# Patient Record
Sex: FEMALE | Race: OTHER | HISPANIC OR LATINO
[De-identification: names, ages, dates, MRNs, and addresses within clinical notes are randomized per-mention and may not be internally consistent; named-entity substitution may affect disease eponyms.]

---

## 2023-09-18 ENCOUNTER — ASOB RESULT (OUTPATIENT)
Age: 33
End: 2023-09-18

## 2023-09-18 ENCOUNTER — APPOINTMENT (OUTPATIENT)
Dept: ANTEPARTUM | Facility: CLINIC | Age: 33
End: 2023-09-18
Payer: MEDICAID

## 2023-09-18 PROCEDURE — 76813 OB US NUCHAL MEAS 1 GEST: CPT

## 2023-09-18 PROCEDURE — 93976 VASCULAR STUDY: CPT

## 2023-10-11 ENCOUNTER — ASOB RESULT (OUTPATIENT)
Age: 33
End: 2023-10-11

## 2023-10-11 ENCOUNTER — APPOINTMENT (OUTPATIENT)
Dept: ANTEPARTUM | Facility: CLINIC | Age: 33
End: 2023-10-11
Payer: MEDICAID

## 2023-10-11 PROCEDURE — 76817 TRANSVAGINAL US OBSTETRIC: CPT

## 2023-10-11 PROCEDURE — 76805 OB US >/= 14 WKS SNGL FETUS: CPT

## 2023-11-15 ENCOUNTER — APPOINTMENT (OUTPATIENT)
Dept: ANTEPARTUM | Facility: CLINIC | Age: 33
End: 2023-11-15
Payer: MEDICAID

## 2023-11-15 ENCOUNTER — ASOB RESULT (OUTPATIENT)
Age: 33
End: 2023-11-15

## 2023-11-15 PROCEDURE — 76817 TRANSVAGINAL US OBSTETRIC: CPT

## 2023-11-15 PROCEDURE — 76811 OB US DETAILED SNGL FETUS: CPT | Mod: 1L

## 2023-12-11 PROBLEM — Z00.00 ENCOUNTER FOR PREVENTIVE HEALTH EXAMINATION: Status: ACTIVE | Noted: 2023-12-11

## 2023-12-29 ENCOUNTER — ASOB RESULT (OUTPATIENT)
Age: 33
End: 2023-12-29

## 2023-12-29 ENCOUNTER — APPOINTMENT (OUTPATIENT)
Dept: ANTEPARTUM | Facility: CLINIC | Age: 33
End: 2023-12-29
Payer: MEDICAID

## 2023-12-29 PROCEDURE — 76821 MIDDLE CEREBRAL ARTERY ECHO: CPT | Mod: 59

## 2023-12-29 PROCEDURE — 76819 FETAL BIOPHYS PROFIL W/O NST: CPT

## 2023-12-29 PROCEDURE — 76816 OB US FOLLOW-UP PER FETUS: CPT

## 2023-12-29 PROCEDURE — 76820 UMBILICAL ARTERY ECHO: CPT | Mod: 59

## 2024-01-19 ENCOUNTER — ASOB RESULT (OUTPATIENT)
Age: 34
End: 2024-01-19

## 2024-01-19 ENCOUNTER — APPOINTMENT (OUTPATIENT)
Dept: ANTEPARTUM | Facility: CLINIC | Age: 34
End: 2024-01-19
Payer: MEDICAID

## 2024-01-19 PROCEDURE — 76820 UMBILICAL ARTERY ECHO: CPT | Mod: 59

## 2024-01-19 PROCEDURE — 76819 FETAL BIOPHYS PROFIL W/O NST: CPT | Mod: 59

## 2024-01-19 PROCEDURE — 76816 OB US FOLLOW-UP PER FETUS: CPT

## 2024-02-23 ENCOUNTER — APPOINTMENT (OUTPATIENT)
Dept: ANTEPARTUM | Facility: CLINIC | Age: 34
End: 2024-02-23
Payer: MEDICAID

## 2024-02-23 ENCOUNTER — ASOB RESULT (OUTPATIENT)
Age: 34
End: 2024-02-23

## 2024-02-23 PROCEDURE — 76818 FETAL BIOPHYS PROFILE W/NST: CPT

## 2024-02-23 PROCEDURE — 76821 MIDDLE CEREBRAL ARTERY ECHO: CPT | Mod: 59

## 2024-02-23 PROCEDURE — 76816 OB US FOLLOW-UP PER FETUS: CPT

## 2024-02-23 PROCEDURE — 76820 UMBILICAL ARTERY ECHO: CPT | Mod: 59

## 2024-03-01 ENCOUNTER — ASOB RESULT (OUTPATIENT)
Age: 34
End: 2024-03-01

## 2024-03-01 ENCOUNTER — APPOINTMENT (OUTPATIENT)
Dept: ANTEPARTUM | Facility: CLINIC | Age: 34
End: 2024-03-01
Payer: MEDICAID

## 2024-03-01 PROCEDURE — 76821 MIDDLE CEREBRAL ARTERY ECHO: CPT | Mod: 59

## 2024-03-01 PROCEDURE — 76820 UMBILICAL ARTERY ECHO: CPT | Mod: 59

## 2024-03-01 PROCEDURE — 76818 FETAL BIOPHYS PROFILE W/NST: CPT

## 2024-03-08 ENCOUNTER — APPOINTMENT (OUTPATIENT)
Dept: ANTEPARTUM | Facility: CLINIC | Age: 34
End: 2024-03-08
Payer: MEDICAID

## 2024-03-08 ENCOUNTER — ASOB RESULT (OUTPATIENT)
Age: 34
End: 2024-03-08

## 2024-03-08 PROCEDURE — 76816 OB US FOLLOW-UP PER FETUS: CPT

## 2024-03-08 PROCEDURE — 76818 FETAL BIOPHYS PROFILE W/NST: CPT

## 2024-03-08 PROCEDURE — 76821 MIDDLE CEREBRAL ARTERY ECHO: CPT | Mod: 59

## 2024-03-15 ENCOUNTER — ASOB RESULT (OUTPATIENT)
Age: 34
End: 2024-03-15

## 2024-03-15 ENCOUNTER — APPOINTMENT (OUTPATIENT)
Dept: ANTEPARTUM | Facility: CLINIC | Age: 34
End: 2024-03-15
Payer: MEDICAID

## 2024-03-15 PROCEDURE — 76820 UMBILICAL ARTERY ECHO: CPT | Mod: 59

## 2024-03-15 PROCEDURE — 76818 FETAL BIOPHYS PROFILE W/NST: CPT

## 2024-03-15 PROCEDURE — 76821 MIDDLE CEREBRAL ARTERY ECHO: CPT | Mod: 59

## 2024-03-16 ENCOUNTER — INPATIENT (INPATIENT)
Facility: HOSPITAL | Age: 34
LOS: 1 days | Discharge: HOME | End: 2024-03-18
Attending: OBSTETRICS & GYNECOLOGY | Admitting: OBSTETRICS & GYNECOLOGY
Payer: COMMERCIAL

## 2024-03-16 VITALS
RESPIRATION RATE: 18 BRPM | SYSTOLIC BLOOD PRESSURE: 126 MMHG | DIASTOLIC BLOOD PRESSURE: 82 MMHG | HEART RATE: 77 BPM | TEMPERATURE: 98 F

## 2024-03-16 DIAGNOSIS — O26.899 OTHER SPECIFIED PREGNANCY RELATED CONDITIONS, UNSPECIFIED TRIMESTER: ICD-10-CM

## 2024-03-16 DIAGNOSIS — Z90.3 ACQUIRED ABSENCE OF STOMACH [PART OF]: Chronic | ICD-10-CM

## 2024-03-16 LAB
BASOPHILS # BLD AUTO: 0.04 K/UL — SIGNIFICANT CHANGE UP (ref 0–0.2)
BASOPHILS NFR BLD AUTO: 0.4 % — SIGNIFICANT CHANGE UP (ref 0–2)
BLD GP AB SCN SERPL QL: NEGATIVE — SIGNIFICANT CHANGE UP
EOSINOPHIL # BLD AUTO: 0.08 K/UL — SIGNIFICANT CHANGE UP (ref 0–0.5)
EOSINOPHIL NFR BLD AUTO: 0.8 % — SIGNIFICANT CHANGE UP (ref 0–6)
HCT VFR BLD CALC: 31.1 % — LOW (ref 34.5–45)
HGB BLD-MCNC: 9.6 G/DL — LOW (ref 11.5–15.5)
IMM GRANULOCYTES NFR BLD AUTO: 0.4 % — SIGNIFICANT CHANGE UP (ref 0–0.9)
LYMPHOCYTES # BLD AUTO: 1.53 K/UL — SIGNIFICANT CHANGE UP (ref 1–3.3)
LYMPHOCYTES # BLD AUTO: 15.5 % — SIGNIFICANT CHANGE UP (ref 13–44)
MCHC RBC-ENTMCNC: 23.7 PG — LOW (ref 27–34)
MCHC RBC-ENTMCNC: 30.9 GM/DL — LOW (ref 32–36)
MCV RBC AUTO: 76.8 FL — LOW (ref 80–100)
MONOCYTES # BLD AUTO: 0.63 K/UL — SIGNIFICANT CHANGE UP (ref 0–0.9)
MONOCYTES NFR BLD AUTO: 6.4 % — SIGNIFICANT CHANGE UP (ref 2–14)
NEUTROPHILS # BLD AUTO: 7.57 K/UL — HIGH (ref 1.8–7.4)
NEUTROPHILS NFR BLD AUTO: 76.5 % — SIGNIFICANT CHANGE UP (ref 43–77)
NRBC # BLD: 0 /100 WBCS — SIGNIFICANT CHANGE UP (ref 0–0)
PLATELET # BLD AUTO: 317 K/UL — SIGNIFICANT CHANGE UP (ref 150–400)
RBC # BLD: 4.05 M/UL — SIGNIFICANT CHANGE UP (ref 3.8–5.2)
RBC # FLD: 14.9 % — HIGH (ref 10.3–14.5)
RH IG SCN BLD-IMP: POSITIVE — SIGNIFICANT CHANGE UP
RH IG SCN BLD-IMP: POSITIVE — SIGNIFICANT CHANGE UP
T PALLIDUM AB TITR SER: NEGATIVE — SIGNIFICANT CHANGE UP
WBC # BLD: 9.89 K/UL — SIGNIFICANT CHANGE UP (ref 3.8–10.5)
WBC # FLD AUTO: 9.89 K/UL — SIGNIFICANT CHANGE UP (ref 3.8–10.5)

## 2024-03-16 RX ORDER — HYDROCORTISONE 1 %
1 OINTMENT (GRAM) TOPICAL EVERY 6 HOURS
Refills: 0 | Status: DISCONTINUED | OUTPATIENT
Start: 2024-03-16 | End: 2024-03-18

## 2024-03-16 RX ORDER — CHLORHEXIDINE GLUCONATE 213 G/1000ML
1 SOLUTION TOPICAL DAILY
Refills: 0 | Status: DISCONTINUED | OUTPATIENT
Start: 2024-03-16 | End: 2024-03-16

## 2024-03-16 RX ORDER — OXYTOCIN 10 UNIT/ML
41.67 VIAL (ML) INJECTION
Qty: 20 | Refills: 0 | Status: DISCONTINUED | OUTPATIENT
Start: 2024-03-16 | End: 2024-03-18

## 2024-03-16 RX ORDER — SIMETHICONE 80 MG/1
80 TABLET, CHEWABLE ORAL EVERY 4 HOURS
Refills: 0 | Status: DISCONTINUED | OUTPATIENT
Start: 2024-03-16 | End: 2024-03-18

## 2024-03-16 RX ORDER — BENZOCAINE 10 %
1 GEL (GRAM) MUCOUS MEMBRANE EVERY 6 HOURS
Refills: 0 | Status: DISCONTINUED | OUTPATIENT
Start: 2024-03-16 | End: 2024-03-18

## 2024-03-16 RX ORDER — ONDANSETRON 8 MG/1
4 TABLET, FILM COATED ORAL ONCE
Refills: 0 | Status: COMPLETED | OUTPATIENT
Start: 2024-03-16 | End: 2024-03-16

## 2024-03-16 RX ORDER — AER TRAVELER 0.5 G/1
1 SOLUTION RECTAL; TOPICAL EVERY 4 HOURS
Refills: 0 | Status: DISCONTINUED | OUTPATIENT
Start: 2024-03-16 | End: 2024-03-18

## 2024-03-16 RX ORDER — OXYTOCIN 10 UNIT/ML
VIAL (ML) INJECTION
Qty: 30 | Refills: 0 | Status: DISCONTINUED | OUTPATIENT
Start: 2024-03-16 | End: 2024-03-16

## 2024-03-16 RX ORDER — PRAMOXINE HYDROCHLORIDE 150 MG/15G
1 AEROSOL, FOAM RECTAL EVERY 4 HOURS
Refills: 0 | Status: DISCONTINUED | OUTPATIENT
Start: 2024-03-16 | End: 2024-03-18

## 2024-03-16 RX ORDER — INFLUENZA VIRUS VACCINE 15; 15; 15; 15 UG/.5ML; UG/.5ML; UG/.5ML; UG/.5ML
0.5 SUSPENSION INTRAMUSCULAR ONCE
Refills: 0 | Status: DISCONTINUED | OUTPATIENT
Start: 2024-03-16 | End: 2024-03-16

## 2024-03-16 RX ORDER — DIBUCAINE 1 %
1 OINTMENT (GRAM) RECTAL EVERY 6 HOURS
Refills: 0 | Status: DISCONTINUED | OUTPATIENT
Start: 2024-03-16 | End: 2024-03-18

## 2024-03-16 RX ORDER — IBUPROFEN 200 MG
600 TABLET ORAL EVERY 6 HOURS
Refills: 0 | Status: COMPLETED | OUTPATIENT
Start: 2024-03-16 | End: 2025-02-12

## 2024-03-16 RX ORDER — LANOLIN
1 OINTMENT (GRAM) TOPICAL EVERY 6 HOURS
Refills: 0 | Status: DISCONTINUED | OUTPATIENT
Start: 2024-03-16 | End: 2024-03-18

## 2024-03-16 RX ORDER — OXYCODONE HYDROCHLORIDE 5 MG/1
5 TABLET ORAL
Refills: 0 | Status: DISCONTINUED | OUTPATIENT
Start: 2024-03-16 | End: 2024-03-18

## 2024-03-16 RX ORDER — MAGNESIUM HYDROXIDE 400 MG/1
30 TABLET, CHEWABLE ORAL
Refills: 0 | Status: DISCONTINUED | OUTPATIENT
Start: 2024-03-16 | End: 2024-03-18

## 2024-03-16 RX ORDER — OXYTOCIN 10 UNIT/ML
333.33 VIAL (ML) INJECTION
Qty: 20 | Refills: 0 | Status: DISCONTINUED | OUTPATIENT
Start: 2024-03-16 | End: 2024-03-16

## 2024-03-16 RX ORDER — DIPHENHYDRAMINE HCL 50 MG
25 CAPSULE ORAL EVERY 6 HOURS
Refills: 0 | Status: DISCONTINUED | OUTPATIENT
Start: 2024-03-16 | End: 2024-03-18

## 2024-03-16 RX ORDER — ACETAMINOPHEN 500 MG
975 TABLET ORAL ONCE
Refills: 0 | Status: COMPLETED | OUTPATIENT
Start: 2024-03-16 | End: 2024-03-16

## 2024-03-16 RX ORDER — CITRIC ACID/SODIUM CITRATE 300-500 MG
15 SOLUTION, ORAL ORAL EVERY 6 HOURS
Refills: 0 | Status: DISCONTINUED | OUTPATIENT
Start: 2024-03-16 | End: 2024-03-16

## 2024-03-16 RX ORDER — FENTANYL/BUPIVACAINE/NS/PF 2MCG/ML-.1
250 PLASTIC BAG, INJECTION (ML) INJECTION
Refills: 0 | Status: DISCONTINUED | OUTPATIENT
Start: 2024-03-16 | End: 2024-03-16

## 2024-03-16 RX ORDER — SODIUM CHLORIDE 9 MG/ML
3 INJECTION INTRAMUSCULAR; INTRAVENOUS; SUBCUTANEOUS EVERY 8 HOURS
Refills: 0 | Status: DISCONTINUED | OUTPATIENT
Start: 2024-03-16 | End: 2024-03-18

## 2024-03-16 RX ORDER — SODIUM CHLORIDE 9 MG/ML
1000 INJECTION, SOLUTION INTRAVENOUS
Refills: 0 | Status: DISCONTINUED | OUTPATIENT
Start: 2024-03-16 | End: 2024-03-16

## 2024-03-16 RX ORDER — ACETAMINOPHEN 500 MG
975 TABLET ORAL
Refills: 0 | Status: DISCONTINUED | OUTPATIENT
Start: 2024-03-16 | End: 2024-03-18

## 2024-03-16 RX ORDER — VALACYCLOVIR 500 MG/1
1000 TABLET, FILM COATED ORAL EVERY 24 HOURS
Refills: 0 | Status: DISCONTINUED | OUTPATIENT
Start: 2024-03-16 | End: 2024-03-17

## 2024-03-16 RX ORDER — AMPICILLIN TRIHYDRATE 250 MG
2 CAPSULE ORAL ONCE
Refills: 0 | Status: COMPLETED | OUTPATIENT
Start: 2024-03-16 | End: 2024-03-16

## 2024-03-16 RX ORDER — AMPICILLIN TRIHYDRATE 250 MG
1 CAPSULE ORAL EVERY 4 HOURS
Refills: 0 | Status: DISCONTINUED | OUTPATIENT
Start: 2024-03-16 | End: 2024-03-16

## 2024-03-16 RX ORDER — TETANUS TOXOID, REDUCED DIPHTHERIA TOXOID AND ACELLULAR PERTUSSIS VACCINE, ADSORBED 5; 2.5; 8; 8; 2.5 [IU]/.5ML; [IU]/.5ML; UG/.5ML; UG/.5ML; UG/.5ML
0.5 SUSPENSION INTRAMUSCULAR ONCE
Refills: 0 | Status: DISCONTINUED | OUTPATIENT
Start: 2024-03-16 | End: 2024-03-18

## 2024-03-16 RX ORDER — OXYCODONE HYDROCHLORIDE 5 MG/1
5 TABLET ORAL ONCE
Refills: 0 | Status: DISCONTINUED | OUTPATIENT
Start: 2024-03-16 | End: 2024-03-18

## 2024-03-16 RX ORDER — KETOROLAC TROMETHAMINE 30 MG/ML
30 SYRINGE (ML) INJECTION ONCE
Refills: 0 | Status: DISCONTINUED | OUTPATIENT
Start: 2024-03-16 | End: 2024-03-17

## 2024-03-16 RX ADMIN — Medication 2 MILLIUNIT(S)/MIN: at 13:45

## 2024-03-16 RX ADMIN — SODIUM CHLORIDE 125 MILLILITER(S): 9 INJECTION, SOLUTION INTRAVENOUS at 13:46

## 2024-03-16 RX ADMIN — SODIUM CHLORIDE 125 MILLILITER(S): 9 INJECTION, SOLUTION INTRAVENOUS at 13:45

## 2024-03-16 RX ADMIN — Medication 108 GRAM(S): at 16:21

## 2024-03-16 RX ADMIN — Medication 216 GRAM(S): at 12:14

## 2024-03-16 RX ADMIN — Medication 108 GRAM(S): at 20:15

## 2024-03-16 RX ADMIN — Medication 975 MILLIGRAM(S): at 18:30

## 2024-03-16 RX ADMIN — SODIUM CHLORIDE 125 MILLILITER(S): 9 INJECTION, SOLUTION INTRAVENOUS at 12:15

## 2024-03-16 RX ADMIN — ONDANSETRON 4 MILLIGRAM(S): 8 TABLET, FILM COATED ORAL at 15:34

## 2024-03-16 NOTE — OB PROVIDER LABOR PROGRESS NOTE - ASSESSMENT
FHT reviewed. Baseline 140, moderate variability, no accels, no decels  TOCO: ctx q3min  cat I     - pitocin paused, FHT now cat I   - epidural in situ   - continue to monitor closely, can restart pitocin if remains cat I

## 2024-03-16 NOTE — OB RN DELIVERY SUMMARY - NS_SEPSISRSKCALC_OBGYN_ALL_OB_FT
EOS calculated successfully. EOS Risk Factor: 0.5/1000 live births ([facility] national incidence); GA=39w6d; Temp=99; ROM=5.8; GBS='Positive'; Antibiotics='Broad spectrum antibiotics > 4 hrs prior to birth'

## 2024-03-16 NOTE — OB PROVIDER H&P - HISTORY OF PRESENT ILLNESS
Patient is a 33 year old  at 39w6d presenting w/ painful contractions since 4 am. Pt reports she had a scheduled induction for this evening but woke up with contractions that have been persistently 4-5 minutes apart. Pt also aware of SGA vs IUGR as the AC was measuring 5th%tile at her scan on 3/08/24.    Pt reports she has not felt the baby move since contractions started.   Denies LOF/VB. Denies HA, vision changes, RUQ/epigastric pain.     Ante: Spontaneous pregnancy c/b finding of AC at the 5th%tile on 3/08/24. 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 NIPT and sonograms WNL (besides mentioned findings). GCT WNL. GBS positive. EFW 2900g on 3/8/24. 3200g on leopolds. AC in the 5th%tile. Weight in the 14th %tile.     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 Denies hx fibroids, cysts, STIs, abnormal pap smears  PMHx: Asthma- no asthma attacks in 4 years, none during pregnancy. Not on medication.   SurgHx: L/S gastric sleeve 2020, uncomplicated.   Meds: Denies  No Known Allergies      PE  T(C): 36.6 (24 @ 10:17), Max: 36.6 (24 @ 10:17)  HR: 77 (24 @ 10:17) (77 - 77)  BP: 126/82 (24 @ 10:17) (126/82 - 126/82)  RR: 18 (24 @ 10:17) (18 - 18)  SpO2: --    General: NAD; Lying comfortably in bed  Pulm: No increased work of breathing noted.   Abdomen: Soft, nontender, gravid.   Extremities: No calf tenderness or swelling noted bilaterally.   SVE: 2/90/-2  SSE: Normal external genitalia. Os visually dilated. No external or cervical lesions noted. No erythema.     NST: Cat I tracing. Baseline 140bpm. Moderate variability. +accels, no decels.   [place]: CTXs q5 minutes.   TAUS: Cephalic presentation, anterior placenta.     A/P: 33y  at 39w6d presenting in early labor, scheduled induction this evening. Antepartum hx significant for AC at the 5th%tile, overall weight 14th %tile.   - Admit to L&D  - NPO and IVF  - Routine labs. Prenatal labs reviewed, as above.   - GBS positive, ampicillin ordered.   - Fetal status reassuring, continuous efm and toco.   - HSVII+, valtrex ordered   - Plan for augmentation of labor with pitocin. Discussed all r/b/a, consent signed      D/w Dr. Lewis, attending  Aleena Boyd PA-C  24 @ 11:54             Patient is a 33 year old  at 39w6d presenting w/ painful contractions since 4 am. Pt reports she had a scheduled induction for this evening but woke up with contractions that have been persistently 4-5 minutes apart. Pt also aware of SGA vs IUGR as the AC was measuring 5th%tile at her scan on 3/08/24.    Pt reports she has not felt the baby move since contractions started.   Denies LOF/VB. Denies HA, vision changes, RUQ/epigastric pain.     Ante: Spontaneous pregnancy c/b finding of AC at the 5th%tile on 3/08/24. 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 NIPT and sonograms WNL (besides mentioned findings). GCT WNL. GBS positive. EFW 2900g on 3/8/24. 3200g on leopolds. AC in the 5th%tile. Weight in the 14th %tile.     ObHx:   GYNHx: HSVII+, on valtrex 1000mg qd since 36 weeks (last outbreak at 12weeks gestation)- denies symptoms at this time. Denies hx fibroids, cysts, STIs, abnormal pap smears  PMHx: Asthma- no asthma attacks in 4 years, none during pregnancy. Not on medication.   SurgHx: L/S gastric sleeve 2020, uncomplicated.   Meds: Denies  No Known Allergies      PE  T(C): 36.6 (24 @ 10:17), Max: 36.6 (24 @ 10:17)  HR: 77 (24 @ 10:17) (77 - 77)  BP: 126/82 (24 @ 10:17) (126/82 - 126/82)  RR: 18 (24 @ 10:17) (18 - 18)  SpO2: --    General: NAD; Lying comfortably in bed  Pulm: No increased work of breathing noted.   Abdomen: Soft, nontender, gravid.   Extremities: No calf tenderness or swelling noted bilaterally.   SVE: 2/90/-2  SSE: Normal external genitalia. Os visually dilated. No external or cervical lesions noted. No erythema.     NST: Cat I tracing. Baseline 140bpm. Moderate variability. +accels, no decels.   [place]: CTXs q5 minutes.   TAUS: Cephalic presentation, anterior placenta.     A/P: 33y  at 39w6d presenting in early labor, scheduled induction this evening. Antepartum hx significant for AC at the 5th%tile, overall weight 14th %tile.   - Admit to L&D  - NPO and IVF  - Routine labs. Prenatal labs reviewed, as above.   - GBS positive, ampicillin ordered.   - Fetal status reassuring, continuous efm and toco.   - HSVII+, valtrex ordered   - Plan for augmentation of labor with pitocin. Discussed all r/b/a, consent signed      D/w Dr. Lewis, attending  Aleena Boyd PA-C  24 @ 11:54

## 2024-03-16 NOTE — OB PROVIDER LABOR PROGRESS NOTE - ASSESSMENT
FHT reviewed. Baseline 145, moderate variability, +accels, no decels  TOCO: no ctx picking up   cat I     - pt admitted for early labor/ IOL for FGR  - s/p epidural   - starting pitocin now   - continue to monitor

## 2024-03-16 NOTE — OB PROVIDER LABOR PROGRESS NOTE - ASSESSMENT
category II tracing, reassuring with moderate variability and +accels, will start pushing and anticipate

## 2024-03-16 NOTE — OB RN DELIVERY SUMMARY - NSSELHIDDEN_OBGYN_ALL_OB_FT
[NS_DeliveryAttending1_OBGYN_ALL_OB_FT:[id number]==],[NS_DeliveryAssist1_OBGYN_ALL_OB_FT:[id number]],[NS_DeliveryRN_OBGYN_ALL_OB_FT:[id number]=],[NS_CirculateRN2_OBGYN_ALL_OB_FT:[id number]]

## 2024-03-16 NOTE — OB PROVIDER DELIVERY SUMMARY - NSSELHIDDEN_OBGYN_ALL_OB_FT
[NS_DeliveryAttending1_OBGYN_ALL_OB_FT:[id number]==],[NS_DeliveryAssist1_OBGYN_ALL_OB_FT:[id number]=]

## 2024-03-16 NOTE — OB PROVIDER LABOR PROGRESS NOTE - ASSESSMENT
Patient is 34 yo  at 39w6d here for IOL. Epidural in situ. Pitocin paused and patient repositioned.   - Allow for recovery of FHT  - Consider AROM if FHT remains reassuring.   - Restart pitocin prn.      Patient is 34 yo  at 39w6d here for augmentation of early labor. Epidural in situ. Pitocin paused and patient repositioned.   - Allow for recovery of FHT  - Consider AROM if FHT remains reassuring.   - Restart pitocin prn.

## 2024-03-16 NOTE — OB NEONATOLOGY/PEDIATRICIAN DELIVERY SUMMARY - NSPEDSNEONOTESA_OBGYN_ALL_OB_FT
NICU team called to delivery for cat II NR FHT. Heavy mec stained fluid at delivery, infant emerged vigorous with good tone and a strong cry at bed. DCC x 30 sec. Brought to warmer, W/D/S/S. Good respiratory effort. Three-vessel cord. Infant pink, crying and stable for admission to N.

## 2024-03-16 NOTE — OB PROVIDER LABOR PROGRESS NOTE - NS_SUBJECTIVE/OBJECTIVE_OBGYN_ALL_OB_FT
Pt seen at bedside due to Cat II tracing 2/2 intermittent late decelerations. Pt reports feeling comfortable w/ epidural.
Pt seen at bedside due to sharp pain and pressure.
SVE FD/+1
Pt seen at bedside for AROM. Pt feeling comfortable w/ epidural

## 2024-03-16 NOTE — OB PROVIDER LABOR PROGRESS NOTE - NS_OBIHIFHRDETAILS_OBGYN_ALL_OB_FT
150 baseline, moderate variability, +accels, variable decels no recurrent
Cat I tracing. Baseline 140bpm. Moderate variability. +accels, no decels noted.
Cat I tracing. Baseline 140bpm. Moderate variability. +accels, no decels noted.
Cat II tracing. Baseline 140bpm. Moderate variability. +accels.

## 2024-03-16 NOTE — PRE-ANESTHESIA EVALUATION ADULT - NSANTHOSAYNRD_GEN_A_CORE
No. RYDER screening performed.  STOP BANG Legend: 0-2 = LOW Risk; 3-4 = INTERMEDIATE Risk; 5-8 = HIGH Risk

## 2024-03-16 NOTE — OB PROVIDER DELIVERY SUMMARY - NSPROVIDERDELIVERYNOTE_OBGYN_ALL_OB_FT
34 yo  at 39w6d presented in early labor at 39w6d. She received pitocin and had AROM at 16:00 for clear fluid. She received epidural for analgesia. Patient became fully dilated, pushed effectively and had  of viable female infant. Placenta delivered spontaneously intact. No lacerations. EBL 300cc. Mother and infant in stable condition.

## 2024-03-16 NOTE — OB PROVIDER LABOR PROGRESS NOTE - ASSESSMENT
Patient is 32 yo  at 39w6d here for augmentation of early labor. Epidural in situ. Pt s/p AROM, scant clear fluid. VE 3/90/-2. Cat I tracing.   - Restart pitocin and titrate per protocol  - Continue to monitor.

## 2024-03-16 NOTE — OB PROVIDER LABOR PROGRESS NOTE - ASSESSMENT
33 year old  at 39w6d here for augmentation of labor. Epidural in situ. S/p AROM. Pt in significant pain. VE noted to be /-1.   - Plan for epidural top off.   - Plan to restart pitocin prn.

## 2024-03-16 NOTE — PRE-ANESTHESIA EVALUATION ADULT - NSANTHPMHFT_GEN_ALL_CORE
Patient is a 33 year old  at 39w6d presenting w/ painful contractions since 4 am. Pt reports she had a scheduled induction for this evening but woke up with contractions that have been persistently 4-5 minutes apart. Pt also aware of SGA vs IUGR as the AC was measuring 5th%tile at her scan on 3/08/24.    Pt reports she has not felt the baby move since contractions started.   Denies LOF/VB. Denies HA, vision changes, RUQ/epigastric pain.     Ante: Spontaneous pregnancy c/b finding of AC at the 5th%tile on 3/08/24. 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 NIPT and sonograms WNL (besides mentioned findings). GCT WNL. GBS positive. EFW 2900g on 3/8/24. 3200g on leopolds. AC in the 5th%tile. Weight in the 14th %tile.     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 Denies hx fibroids, cysts, STIs, abnormal pap smears  PMHx: Asthma- no asthma attacks in 4 years, none during pregnancy. Not on medication.   SurgHx: L/S gastric sleeve 2020, uncomplicated.   Meds: Denies  No Known Allergies

## 2024-03-17 ENCOUNTER — TRANSCRIPTION ENCOUNTER (OUTPATIENT)
Age: 34
End: 2024-03-17

## 2024-03-17 RX ORDER — IBUPROFEN 200 MG
600 TABLET ORAL EVERY 6 HOURS
Refills: 0 | Status: DISCONTINUED | OUTPATIENT
Start: 2024-03-17 | End: 2024-03-18

## 2024-03-17 RX ORDER — ACETAMINOPHEN 500 MG
3 TABLET ORAL
Qty: 0 | Refills: 0 | DISCHARGE
Start: 2024-03-17

## 2024-03-17 RX ORDER — IBUPROFEN 200 MG
1 TABLET ORAL
Qty: 0 | Refills: 0 | DISCHARGE
Start: 2024-03-17

## 2024-03-17 RX ORDER — VALACYCLOVIR 500 MG/1
500 TABLET, FILM COATED ORAL EVERY 12 HOURS
Refills: 0 | Status: DISCONTINUED | OUTPATIENT
Start: 2024-03-17 | End: 2024-03-18

## 2024-03-17 RX ADMIN — VALACYCLOVIR 500 MILLIGRAM(S): 500 TABLET, FILM COATED ORAL at 21:58

## 2024-03-17 RX ADMIN — SODIUM CHLORIDE 3 MILLILITER(S): 9 INJECTION INTRAMUSCULAR; INTRAVENOUS; SUBCUTANEOUS at 13:15

## 2024-03-17 RX ADMIN — Medication 600 MILLIGRAM(S): at 12:44

## 2024-03-17 RX ADMIN — VALACYCLOVIR 500 MILLIGRAM(S): 500 TABLET, FILM COATED ORAL at 10:37

## 2024-03-17 RX ADMIN — Medication 975 MILLIGRAM(S): at 09:07

## 2024-03-17 RX ADMIN — Medication 30 MILLIGRAM(S): at 00:46

## 2024-03-17 RX ADMIN — SODIUM CHLORIDE 3 MILLILITER(S): 9 INJECTION INTRAMUSCULAR; INTRAVENOUS; SUBCUTANEOUS at 22:00

## 2024-03-17 RX ADMIN — Medication 975 MILLIGRAM(S): at 20:13

## 2024-03-17 RX ADMIN — Medication 975 MILLIGRAM(S): at 15:11

## 2024-03-17 RX ADMIN — Medication 600 MILLIGRAM(S): at 06:28

## 2024-03-17 RX ADMIN — Medication 1 TABLET(S): at 12:44

## 2024-03-17 RX ADMIN — Medication 600 MILLIGRAM(S): at 18:26

## 2024-03-17 NOTE — LACTATION INITIAL EVALUATION - LACTATION INTERVENTIONS
discussed initiating TFP at 24 hours of life if no latch. Baby very sleepy over several visits, mom has copious colostrum (2cc expressed in ~ 10 seconds of expression), syringe fed while baby sucked on gloved finger. Mom understands plan. All questions and concerns addressed, primary RN updated./initiate/review safe skin-to-skin/initiate/review hand expression/initiate/review pumping guidelines and safe milk handling/initiate/review techniques for position and latch/post discharge community resources provided/initiate/review finger suck/initiate/review breast massage/compression/initiate/review alternate feeding method/reviewed components of an effective feeding and at least 8 effective feedings per day required/reviewed importance of monitoring infant diapers, the breastfeeding log, and minimum output each day/reviewed risks of unnecessary formula supplementation/reviewed risks of artificial nipples/reviewed strategies to transition to breastfeeding only/reviewed benefits and recommendations for rooming in/reviewed feeding on demand/by cue at least 8 times a day/reviewed indications of inadequate milk transfer that would require supplementation

## 2024-03-17 NOTE — DISCHARGE NOTE OB - NS MD DC FALL RISK RISK
For information on Fall & Injury Prevention, visit: [email or web address] OR  [email or web address] OR  https://www.cdc.gov/steadi/patient.html

## 2024-03-17 NOTE — DISCHARGE NOTE OB - PATIENT PORTAL LINK FT
You can access the FollowMyHealth Patient Portal offered by [facility] by registering at the following website: [email or web address]. By joining [facility]’s FollowMyHealth portal, you will also be able to view your health information using other applications (apps) compatible with our system.

## 2024-03-17 NOTE — DISCHARGE NOTE OB - CARE PROVIDER_API CALL
Sergey Lewis  Obstetrics and Gynecology  [address] [address]  Phone: [phone]  Fax: [phone]  Follow Up Time:

## 2024-03-17 NOTE — DISCHARGE NOTE OB - HOSPITAL COURSE
Patient is status post a vaginal delivery after presenting in early labor. She had an uncomplicated postpartum course and has met her postpartum milestones appropriately.  Vitals are stable for discharge.

## 2024-03-18 VITALS
DIASTOLIC BLOOD PRESSURE: 67 MMHG | HEART RATE: 76 BPM | OXYGEN SATURATION: 98 % | TEMPERATURE: 98 F | SYSTOLIC BLOOD PRESSURE: 102 MMHG | RESPIRATION RATE: 17 BRPM

## 2024-03-18 PROCEDURE — 86900 BLOOD TYPING SEROLOGIC ABO: CPT

## 2024-03-18 PROCEDURE — 86850 RBC ANTIBODY SCREEN: CPT

## 2024-03-18 PROCEDURE — 86780 TREPONEMA PALLIDUM: CPT

## 2024-03-18 PROCEDURE — 59050 FETAL MONITOR W/REPORT: CPT

## 2024-03-18 PROCEDURE — 85025 COMPLETE CBC W/AUTO DIFF WBC: CPT

## 2024-03-18 PROCEDURE — 36415 COLL VENOUS BLD VENIPUNCTURE: CPT

## 2024-03-18 PROCEDURE — 86901 BLOOD TYPING SEROLOGIC RH(D): CPT

## 2024-03-18 RX ORDER — INFLUENZA VIRUS VACCINE 15; 15; 15; 15 UG/.5ML; UG/.5ML; UG/.5ML; UG/.5ML
0.5 SUSPENSION INTRAMUSCULAR ONCE
Refills: 0 | Status: DISCONTINUED | OUTPATIENT
Start: 2024-03-18 | End: 2024-03-18

## 2024-03-18 RX ADMIN — Medication 600 MILLIGRAM(S): at 11:57

## 2024-03-18 RX ADMIN — Medication 975 MILLIGRAM(S): at 02:46

## 2024-03-18 RX ADMIN — Medication 1 TABLET(S): at 11:57

## 2024-03-18 RX ADMIN — VALACYCLOVIR 500 MILLIGRAM(S): 500 TABLET, FILM COATED ORAL at 08:49

## 2024-03-18 RX ADMIN — Medication 600 MILLIGRAM(S): at 06:07

## 2024-03-18 RX ADMIN — Medication 600 MILLIGRAM(S): at 00:08

## 2024-03-18 RX ADMIN — Medication 975 MILLIGRAM(S): at 08:49

## 2024-03-18 NOTE — PROGRESS NOTE ADULT - ASSESSMENT
A/P 33y s/p , PPD2 , stable, meeting postpartum milestones   - Pain: well controlled on tylenol/motrin  - GI: Tolerating regular diet  - : urinating without difficulty/pain  - DVT prophylaxis: ambulating frequently  - Dispo: PPD 2, unless otherwise specified    
A/P 33y s/p , PPD#1 , stable, meeting postpartum milestones   - Pain: well controlled on tylenol/motrin  - GI: Tolerating regular diet  - : urinating without difficulty/pain  - DVT prophylaxis: ambulating frequently  - Dispo: PPD 2, unless otherwise specified

## 2024-03-18 NOTE — PROGRESS NOTE ADULT - SUBJECTIVE AND OBJECTIVE BOX
Patient evaluated at bedside this morning, resting comfortable in bed, no acute events overnight.  She reports pain is well controlled with tylenol and motrin.  She denies  nausea, vomiting, fever, chills, heavy vaginal bleeding. She has been ambulating without assistance, voiding spontaneously.  Tolerating food well, without nausea/vomit.      Physical Exam:  T(C): 36.7 (03-17-24 @ 21:57), Max: 36.7 (03-17-24 @ 21:57)  HR: 75 (03-17-24 @ 21:57) (75 - 75)  BP: 105/69 (03-17-24 @ 21:57) (105/69 - 105/69)  RR: 18 (03-17-24 @ 21:57) (18 - 18)  SpO2: 99% (03-17-24 @ 21:57) (99% - 99%)    GA: lying comfortably in bed, NAD  Pulm: no increased work of breathing  Abd: soft, nontender, nondistended, no rebound or guarding, uterus firm.  Extremities: no calf tenderness                          9.6    9.89  )-----------( 317      ( 16 Mar 2024 11:05 )             31.1           acetaminophen     Tablet .. 975 milliGRAM(s) Oral <User Schedule>  benzocaine 20%/menthol 0.5% Spray 1 Spray(s) Topical every 6 hours PRN  dibucaine 1% Ointment 1 Application(s) Topical every 6 hours PRN  diphenhydrAMINE 25 milliGRAM(s) Oral every 6 hours PRN  diphtheria/tetanus/pertussis (acellular) Vaccine (Adacel) 0.5 milliLiter(s) IntraMuscular once  hydrocortisone 1% Cream 1 Application(s) Topical every 6 hours PRN  ibuprofen  Tablet. 600 milliGRAM(s) Oral every 6 hours  lanolin Ointment 1 Application(s) Topical every 6 hours PRN  magnesium hydroxide Suspension 30 milliLiter(s) Oral two times a day PRN  oxyCODONE    IR 5 milliGRAM(s) Oral once PRN  oxyCODONE    IR 5 milliGRAM(s) Oral every 3 hours PRN  oxytocin Infusion 41.667 milliUNIT(s)/Min IV Continuous <Continuous>  pramoxine 1%/zinc 5% Cream 1 Application(s) Topical every 4 hours PRN  prenatal multivitamin 1 Tablet(s) Oral daily  simethicone 80 milliGRAM(s) Chew every 4 hours PRN  sodium chloride 0.9% lock flush 3 milliLiter(s) IV Push every 8 hours  valACYclovir 500 milliGRAM(s) Oral every 12 hours  witch hazel Pads 1 Application(s) Topical every 4 hours PRN  
Patient evaluated at bedside this morning, resting comfortable in bed, no acute events overnight.  She reports pain is well controlled with tylenol and motrin.  She denies heavy vaginal bleeding. She has been ambulating without assistance, voiding spontaneously.  Tolerating food well, without nausea/vomit.      Physical Exam:  T(C): 37 (03-17-24 @ 09:03), Max: 37.2 (03-16-24 @ 22:10)  HR: 75 (03-17-24 @ 09:03) (62 - 88)  BP: 102/67 (03-17-24 @ 09:03) (99/63 - 113/50)  RR: 18 (03-17-24 @ 09:03) (15 - 18)  SpO2: 97% (03-17-24 @ 09:03) (97% - 100%)    GA: comfortable-appearing, NAD  Pulm: no increased work of breathing  Abd: soft, nontender, nondistended, no rebound or guarding, uterus firm.  Extremities: no calf tenderness                          9.6    9.89  )-----------( 317      ( 16 Mar 2024 11:05 )             31.1           acetaminophen     Tablet .. 975 milliGRAM(s) Oral <User Schedule>  benzocaine 20%/menthol 0.5% Spray 1 Spray(s) Topical every 6 hours PRN  dibucaine 1% Ointment 1 Application(s) Topical every 6 hours PRN  diphenhydrAMINE 25 milliGRAM(s) Oral every 6 hours PRN  diphtheria/tetanus/pertussis (acellular) Vaccine (Adacel) 0.5 milliLiter(s) IntraMuscular once  hydrocortisone 1% Cream 1 Application(s) Topical every 6 hours PRN  ibuprofen  Tablet. 600 milliGRAM(s) Oral every 6 hours  lanolin Ointment 1 Application(s) Topical every 6 hours PRN  magnesium hydroxide Suspension 30 milliLiter(s) Oral two times a day PRN  oxyCODONE    IR 5 milliGRAM(s) Oral once PRN  oxyCODONE    IR 5 milliGRAM(s) Oral every 3 hours PRN  oxytocin Infusion 41.667 milliUNIT(s)/Min IV Continuous <Continuous>  pramoxine 1%/zinc 5% Cream 1 Application(s) Topical every 4 hours PRN  prenatal multivitamin 1 Tablet(s) Oral daily  simethicone 80 milliGRAM(s) Chew every 4 hours PRN  sodium chloride 0.9% lock flush 3 milliLiter(s) IV Push every 8 hours  valACYclovir 500 milliGRAM(s) Oral every 12 hours  witch hazel Pads 1 Application(s) Topical every 4 hours PRN

## 2024-03-29 DIAGNOSIS — Z3A.39 39 WEEKS GESTATION OF PREGNANCY: ICD-10-CM

## 2024-03-29 DIAGNOSIS — J45.909 UNSPECIFIED ASTHMA, UNCOMPLICATED: ICD-10-CM

## 2024-03-29 DIAGNOSIS — B00.9 HERPESVIRAL INFECTION, UNSPECIFIED: ICD-10-CM

## 2024-03-29 DIAGNOSIS — O36.5930 MATERNAL CARE FOR OTHER KNOWN OR SUSPECTED POOR FETAL GROWTH, THIRD TRIMESTER, NOT APPLICABLE OR UNSPECIFIED: ICD-10-CM

## 2024-09-12 NOTE — OB RN PATIENT PROFILE - FUNCTIONAL ASSESSMENT - DAILY ACTIVITY 4.
She should have it done in the next year.  I can set that up for her if she wants.   4 = No assist / stand by assistance

## 2024-09-17 NOTE — OB RN DELIVERY SUMMARY - AS DELIV COMPLICATIONS OB
Bed: 53  Expected date:   Expected time:   Means of arrival:   Comments:  14   abnormal fetal heart rate tracing/meconium stained fluid

## 2025-03-24 NOTE — LACTATION INITIAL EVALUATION - NIPPLE ASSESSMENT (RIGHT)
medium/compressible
Acute Cough  WHAT YOU NEED TO KNOW:  An acute cough can last up to 3 weeks. Common causes of an acute cough include a cold, allergies, or a lung infection.   DISCHARGE INSTRUCTIONS:  Return to the emergency department if:   •You have trouble breathing or feel short of breath.  •You cough up blood, or you see blood in your mucus.   •You faint or feel weak or dizzy.   •You have chest pain when you cough or take a deep breath.   •You have new wheezing.   Contact your healthcare provider if:   •You have a fever.   •Your cough lasts longer than 4 weeks.   •Your symptoms do not improve with treatment.   •You have questions or concerns about your condition or care.     Medicines:   •Medicines may be needed to stop the cough, decrease swelling in your airways, or help open your airways. Medicine may also be given to help you cough up mucus. Ask your healthcare provider what over-the-counter medicines you can take. If you have an infection caused by bacteria, you may need antibiotics.     •Take your medicine as directed. Contact your healthcare provider if you think your medicine is not helping or if you have side effects. Tell him or her if you are allergic to any medicine. Keep a list of the medicines, vitamins, and herbs you take. Include the amounts, and when and why you take them. Bring the list or the pill bottles to follow-up visits. Carry your medicine list with you in case of an emergency.  Manage your symptoms:   •Do not smoke and stay away from others who smoke. Nicotine and other chemicals in cigarettes and cigars can cause lung damage and make your cough worse. Ask your healthcare provider for information if you currently smoke and need help to quit. E-cigarettes or smokeless tobacco still contain nicotine. Talk to your healthcare provider before you use these products.   •Drink extra liquids as directed. Liquids will help thin and loosen mucus so you can cough it up. Liquids will also help prevent dehydration. Examples of good liquids to drink include water, fruit juice, and broth. Do not drink liquids that contain caffeine. Caffeine can increase your risk for dehydration. Ask your healthcare provider how much liquid to drink each day.   •Rest as directed. Do not do activities that make your cough worse, such as exercise.   •Use a humidifier or vaporizer. Use a cool mist humidifier or a vaporizer to increase air moisture in your home. This may make it easier for you to breathe and help decrease your cough.   •Eat 2 to 5 mL of honey 2 times each day. Honey can help thin mucus and decrease your cough.   •Use cough drops or lozenges. These can help decrease throat irritation and your cough.   Follow up with your healthcare provider as directed: Write down your questions so you remember to ask them during your visits.   Acute Cough  WHAT YOU NEED TO KNOW:  An acute cough can last up to 3 weeks. Common causes of an acute cough include a cold, allergies, or a lung infection.   DISCHARGE INSTRUCTIONS:  Return to the emergency department if:   •You have trouble breathing or feel short of breath.  •You cough up blood, or you see blood in your mucus.   •You faint or feel weak or dizzy.   •You have chest pain when you cough or take a deep breath.   •You have new wheezing.   Contact your healthcare provider if:   •You have a fever.   •Your cough lasts longer than 4 weeks.   •Your symptoms do not improve with treatment.   •You have questions or concerns about your condition or care.     Medicines:   •Medicines may be needed to stop the cough, decrease swelling in your airways, or help open your airways. Medicine may also be given to help you cough up mucus. Ask your healthcare provider what over-the-counter medicines you can take. If you have an infection caused by bacteria, you may need antibiotics.     •Take your medicine as directed. Contact your healthcare provider if you think your medicine is not helping or if you have side effects. Tell him or her if you are allergic to any medicine. Keep a list of the medicines, vitamins, and herbs you take. Include the amounts, and when and why you take them. Bring the list or the pill bottles to follow-up visits. Carry your medicine list with you in case of an emergency.  Manage your symptoms:   •Do not smoke and stay away from others who smoke. Nicotine and other chemicals in cigarettes and cigars can cause lung damage and make your cough worse. Ask your healthcare provider for information if you currently smoke and need help to quit. E-cigarettes or smokeless tobacco still contain nicotine. Talk to your healthcare provider before you use these products.   •Drink extra liquids as directed. Liquids will help thin and loosen mucus so you can cough it up. Liquids will also help prevent dehydration. Examples of good liquids to drink include water, fruit juice, and broth. Do not drink liquids that contain caffeine. Caffeine can increase your risk for dehydration. Ask your healthcare provider how much liquid to drink each day.   •Rest as directed. Do not do activities that make your cough worse, such as exercise.   •Use a humidifier or vaporizer. Use a cool mist humidifier or a vaporizer to increase air moisture in your home. This may make it easier for you to breathe and help decrease your cough.   •Eat 2 to 5 mL of honey 2 times each day. Honey can help thin mucus and decrease your cough.   •Use cough drops or lozenges. These can help decrease throat irritation and your cough.   Follow up with your healthcare provider as directed: Write down your questions so you remember to ask them during your visits.     Tos aguda  LO QUE NECESITA SABER:  Ariadna tos aguda puede durar hasta 3 semanas. Las causas comunes de ariadna tos aguda incluyen un resfriado, alergias o ariadna infección pulmonar.    [facility]CCIONES [facility]BRE [facility]:  Regrese a la nnamdi de emergencias si:  •Tiene dificultad para respirar o le falta el aire.  •Usted tose brynn o nota brynn en [place] mucosidad.  •Se desmaya, se siente débil o mareado.  •Le duele el pecho cuando respira hondo o tose.  •Tiene respiración silbante.  Comuníquese con mclaughlin médico si:  •Tiene fiebre.  •La tos dura más de 4 semanas.  •Shelly síntomas no mejoran con el tratamiento.  •Usted tiene preguntas o inquietudes acerca de mclaughiln condición o cuidado.  Medicamentos:  •Los medicamentospara detener la tos, disminuir la inflamación de las vías respiratorias o ayudar a abrirlas. Los medicamentos también pueden despejar las vías respiratorias. Pregunte a [place] médico qué medicamentos de venta lauren puede tiburcio. Si tiene ariadna infección causada por bacterias, es posible que necesite antibióticos.  •[place] shelly medicamentos mary se le haya indicado.Consulte con [place] médico si usted ryann que m[place] medicamento no le está ayudando o si presenta efectos secundarios. Infórmele si es alérgico a cualquier medicamento. Mantenga ariadna lista actualizada de los medicamentos, las vitaminas y los productos herbales que cris. Incluya los siguientes datos de los medicamentos: cantidad, frecuencia y motivo de administración. Traiga con usted la lista o los envases de las píldoras a shelly citas de seguimiento. Lleve la lista de los medicamentos con usted en wilbert de ariadna emergencia.  El manejo de shelly síntomas:  •No fume y permanezca lejos de otras personas que fuman.La nicotina y otros químicos contenidos en los cigarrillos y puros pueden causar daño pulmonar y empeorar mclaughlin tos. Pida información a m[place] médico si usted actualmente fuma y necesita ayuda para dejar de fumar. Los cigarrillos electrónicos o el tabaco sin humo igualmente contienen nicotina. Consulte con mcl[place] médico antes de utilizar estos productos.  •[place] líquidos adicionales mary se le indique.Los líquidos le ayudarán a aflojar y disolver la mucosidad para que la pueda expectorar. Los líquidos ayudarán a evitar la deshidratación. Los mejores líquidos para tiburcio son el agua, el jugo y el caldo. No tome líquidos que contienen cafeína. La cafeína puede aumentar el riesgo de deshidratación. Pregúntele a mcl[place] médico cuál es la cantidad de líquido que necesita ingerir a diario.  •Repose según le indicaron.No realice actividades que empeoren la tos, mary el ejercicio físico.  •Use un humidificador o vaporizador.Use un humidificador de chirag frío o un vaporizador para elevar la humedad en m[place] casa. [place] podría ayudarle a respirar más fácilmente y al mismo tiempo disminuir la tos.  •Ingiera de 2 a 5 ml de miel dos veces al día.La miel puede ayudar a diluir los mocos y disminuir la tos.  •Utilice gotas o pastillas para la tos.Estas pueden ayudar a disminuir la irritación de la garganta y la tos.  Acuda a shelly consultas de control con [place] médico según le indicaron.Anote shelly preguntas para que se acuerde de hacerlas marisel shelly visitas.